# Patient Record
Sex: MALE | Race: WHITE | NOT HISPANIC OR LATINO
[De-identification: names, ages, dates, MRNs, and addresses within clinical notes are randomized per-mention and may not be internally consistent; named-entity substitution may affect disease eponyms.]

---
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
## 2018-06-15 ENCOUNTER — EMERGENCY (EMERGENCY)
Facility: HOSPITAL | Age: 18
LOS: 1 days | Discharge: HOME | End: 2018-06-15
Attending: EMERGENCY MEDICINE
Payer: COMMERCIAL

## 2018-06-15 VITALS
WEIGHT: 164.91 LBS | HEART RATE: 75 BPM | SYSTOLIC BLOOD PRESSURE: 101 MMHG | OXYGEN SATURATION: 98 % | DIASTOLIC BLOOD PRESSURE: 63 MMHG | HEIGHT: 70 IN | TEMPERATURE: 98 F | RESPIRATION RATE: 19 BRPM

## 2018-06-15 DIAGNOSIS — Z98.890 OTHER SPECIFIED POSTPROCEDURAL STATES: Chronic | ICD-10-CM

## 2018-06-15 PROCEDURE — 99283 EMERGENCY DEPT VISIT LOW MDM: CPT

## 2018-06-15 PROCEDURE — 73562 X-RAY EXAM OF KNEE 3: CPT | Mod: 26,RT

## 2018-06-15 PROCEDURE — 73562 X-RAY EXAM OF KNEE 3: CPT

## 2018-06-15 RX ORDER — IBUPROFEN 200 MG
600 TABLET ORAL ONCE
Qty: 0 | Refills: 0 | Status: COMPLETED | OUTPATIENT
Start: 2018-06-15 | End: 2018-06-15

## 2018-06-15 RX ADMIN — Medication 600 MILLIGRAM(S): at 14:45

## 2018-06-15 RX ADMIN — Medication 600 MILLIGRAM(S): at 15:03

## 2018-06-15 NOTE — ED ADULT TRIAGE NOTE - CHIEF COMPLAINT QUOTE
pt was doing yard work, fell on his knee. pt is unable to move the right knee  pt denies LOC or head injury

## 2018-06-15 NOTE — ED PROVIDER NOTE - CARE PLAN
Principal Discharge DX:	Other closed fracture of right patella, initial encounter Principal Discharge DX:	Acute pain of right knee

## 2018-06-15 NOTE — ED ADULT NURSE NOTE - OBJECTIVE STATEMENT
Patient was gardening and fell forward landing on his right knee. Patient having difficulty bearing weight and moving the knee.

## 2018-06-15 NOTE — ED PROVIDER NOTE - OBJECTIVE STATEMENT
17 year old bib family non-ambulatory with c/o R knee pain. was gardening and fell directly onto R knee (patella) pian since over area and just above patella. Worse on quadriceps engagement. No other knee ain/swelling/laxity. No other injury or c/o  PMHx/Meds/All: none  Tet: UTD  Soc Hx: infreg etoh o/wise neg

## 2018-06-15 NOTE — ED PROVIDER NOTE - MEDICAL DECISION MAKING DETAILS
MD Precious,Attending: please see above PE. for xrays to evaluate for possible patella fracture (bipartate). for ortho referral if positive and likely d/c with knee immobilized and crutches for early followup and prn analgesia at home

## 2018-06-15 NOTE — ED PROVIDER NOTE - PHYSICAL EXAMINATION
MD Precious,Attending: tnedrness over patella with mild swelling. ? bipartaite fracture with small fragment off superomedial patella sitting lower patellar ligamemnt above jpint. No effusion of knee joint laxity. Exam otherwise NAD. Distally NVI

## 2018-06-19 ENCOUNTER — APPOINTMENT (OUTPATIENT)
Dept: SPORTS MEDICINE | Facility: CLINIC | Age: 18
End: 2018-06-19
Payer: COMMERCIAL

## 2018-06-19 DIAGNOSIS — S76.109A UNSPECIFIED INJURY OF UNSPECIFIED QUADRICEPS MUSCLE, FASCIA AND TENDON, INITIAL ENCOUNTER: ICD-10-CM

## 2018-06-19 DIAGNOSIS — M25.561 PAIN IN RIGHT KNEE: ICD-10-CM

## 2018-06-19 DIAGNOSIS — S80.01XA CONTUSION OF RIGHT KNEE, INITIAL ENCOUNTER: ICD-10-CM

## 2018-06-19 PROCEDURE — 29530 STRAPPING OF KNEE: CPT

## 2018-06-19 PROCEDURE — 99203 OFFICE O/P NEW LOW 30 MIN: CPT | Mod: 25

## 2018-10-01 ENCOUNTER — OUTPATIENT (OUTPATIENT)
Dept: OUTPATIENT SERVICES | Facility: HOSPITAL | Age: 18
LOS: 1 days | End: 2018-10-01
Payer: COMMERCIAL

## 2018-10-01 ENCOUNTER — APPOINTMENT (OUTPATIENT)
Dept: MRI IMAGING | Facility: CLINIC | Age: 18
End: 2018-10-01
Payer: COMMERCIAL

## 2018-10-01 DIAGNOSIS — S83.242A OTHER TEAR OF MEDIAL MENISCUS, CURRENT INJURY, LEFT KNEE, INITIAL ENCOUNTER: ICD-10-CM

## 2018-10-01 DIAGNOSIS — Z98.890 OTHER SPECIFIED POSTPROCEDURAL STATES: Chronic | ICD-10-CM

## 2018-10-01 PROCEDURE — 73721 MRI JNT OF LWR EXTRE W/O DYE: CPT

## 2018-10-01 PROCEDURE — 73721 MRI JNT OF LWR EXTRE W/O DYE: CPT | Mod: 26,LT

## 2018-11-27 ENCOUNTER — EMERGENCY (EMERGENCY)
Facility: HOSPITAL | Age: 18
LOS: 1 days | Discharge: HOME | End: 2018-11-27
Attending: EMERGENCY MEDICINE
Payer: COMMERCIAL

## 2018-11-27 VITALS
OXYGEN SATURATION: 100 % | HEART RATE: 74 BPM | WEIGHT: 164.91 LBS | TEMPERATURE: 98 F | SYSTOLIC BLOOD PRESSURE: 127 MMHG | RESPIRATION RATE: 18 BRPM | HEIGHT: 69 IN | DIASTOLIC BLOOD PRESSURE: 72 MMHG

## 2018-11-27 DIAGNOSIS — Z98.890 OTHER SPECIFIED POSTPROCEDURAL STATES: Chronic | ICD-10-CM

## 2018-11-27 PROCEDURE — 99283 EMERGENCY DEPT VISIT LOW MDM: CPT | Mod: 25

## 2018-11-27 PROCEDURE — 12002 RPR S/N/AX/GEN/TRNK2.6-7.5CM: CPT

## 2018-11-27 PROCEDURE — 90715 TDAP VACCINE 7 YRS/> IM: CPT

## 2018-11-27 PROCEDURE — 12002 RPR S/N/AX/GEN/TRNK2.6-7.5CM: CPT | Mod: FA

## 2018-11-27 PROCEDURE — 90471 IMMUNIZATION ADMIN: CPT

## 2018-11-27 RX ORDER — TETANUS TOXOID, REDUCED DIPHTHERIA TOXOID AND ACELLULAR PERTUSSIS VACCINE, ADSORBED 5; 2.5; 8; 8; 2.5 [IU]/.5ML; [IU]/.5ML; UG/.5ML; UG/.5ML; UG/.5ML
0.5 SUSPENSION INTRAMUSCULAR ONCE
Qty: 0 | Refills: 0 | Status: COMPLETED | OUTPATIENT
Start: 2018-11-27 | End: 2018-11-27

## 2018-11-27 RX ORDER — IBUPROFEN 200 MG
400 TABLET ORAL ONCE
Qty: 0 | Refills: 0 | Status: COMPLETED | OUTPATIENT
Start: 2018-11-27 | End: 2018-11-27

## 2018-11-27 RX ADMIN — TETANUS TOXOID, REDUCED DIPHTHERIA TOXOID AND ACELLULAR PERTUSSIS VACCINE, ADSORBED 0.5 MILLILITER(S): 5; 2.5; 8; 8; 2.5 SUSPENSION INTRAMUSCULAR at 17:42

## 2018-11-27 RX ADMIN — Medication 400 MILLIGRAM(S): at 17:42

## 2018-11-27 NOTE — ED PROVIDER NOTE - PLAN OF CARE
You were seen in the emergency department for a laceration. Please follow up with your primary doctor in the next 5-7 days. Visit your doctor, an urgent care, or the emergency department in 7-10 days to have the sutures removed. Keep the affected area clean and dry, and apply bacitracin twice a day. Return to the emergency department immediately if you experience increased swelling, redness, drainage of pus from the area, fever or any other concerning symptoms.

## 2018-11-27 NOTE — ED ADULT TRIAGE NOTE - CHIEF COMPLAINT QUOTE
Patient presents with a left first finger injury. Patient has bleeding controlled via direct pressure. Patient unsure of last tetanus immunization.

## 2018-11-27 NOTE — ED PROVIDER NOTE - SKIN AREA #1
distal/3cm lac on volar surface of distal digit of left thumb, just adjacent to but not involving nail, with flap like 2cm piece of skin 1cm lac on volar surface of distal digit of left thumb, just adjacent to but not involving nail, with flap like 2cm piece of skin/distal

## 2018-11-27 NOTE — ED PROCEDURE NOTE - ATTENDING CONTRIBUTION TO CARE
Attending MD Becki Dial: Risks, benefit and alternatives of procedure explained to patient and patient demonstrated verbal understanding and consent.  I was present during the key portions of the procedure. Patient tolerated procedure well without complications

## 2018-11-27 NOTE — ED PROVIDER NOTE - OBJECTIVE STATEMENT
19 y/o M w pmhx of pilonidal cyst, asthma and pshx of ear tube placement p/w left thumb injury while opening up a box with a knife today. Notes blood and pain at the site. Pt says he taped it up immediately after injury. Knife was brand new and clean. Unsure of his last tetanus shot.   PCP: Bar Mars 17 y/o M w pmhx of pilonidal cyst, asthma and pshx of ear tube placement p/w left thumb injury while opening up a box with a knife today. Notes blood and pain at the site. Pt says he taped it up immediately after injury. Knife was brand new and clean. Pt unsure of his last tetanus shot.   PCP: Bar Mars

## 2018-11-27 NOTE — ED PROVIDER NOTE - NS_ ATTENDINGSCRIBEDETAILS _ED_A_ED_FT
Becki Dial MD - Attending Physician: The scribe's documentation has been prepared under my direction and personally reviewed by me in its entirety. I confirm that the note above accurately reflects all work, treatment, procedures, and medical decision making performed by me.

## 2018-11-27 NOTE — ED ADULT NURSE NOTE - OBJECTIVE STATEMENT
Pt presents to ED awake, alert and ambulatory, c/o finger lac. Pt states he was using a new knife to open a box and slipped and cut his right thumb. Bled at first and pt taped it which stopped the bleeding. Dried blood to cuticle around right thumb extending around medially. No active bleeding noted. No blood thinners. No other injuries.

## 2018-11-27 NOTE — ED PROVIDER NOTE - ATTENDING CONTRIBUTION TO CARE
Becki Dial MD - Attending Physician: I have personally seen and examined this patient with the resident/fellow.  I have fully participated in the care of this patient. I have reviewed all pertinent clinical information, including history, physical exam, plan and the Resident/Fellow’s note and agree except as noted. See MDM

## 2018-11-27 NOTE — ED PROVIDER NOTE - MEDICAL DECISION MAKING DETAILS
18m here for left thumb lac 2/2 cutting self with clean knife at home while opening box. Mild active bleeding of 3cm volar distal thumb lac. Will repair lac, pain ctrl, tdap dc 18m here for left thumb lac 2/2 cutting self with clean knife at home while opening box. Mild active bleeding of 3cm volar distal thumb lac. Will repair lac, pain ctrl, tdap dc    Becki Dial MD - Attending Physician: Pt here with thumb lac, no other injuries. Wound care, Tdap for dc

## 2018-11-27 NOTE — ED PROVIDER NOTE - CARE PLAN
Principal Discharge DX:	Thumb laceration, left, initial encounter  Assessment and plan of treatment:	You were seen in the emergency department for a laceration. Please follow up with your primary doctor in the next 5-7 days. Visit your doctor, an urgent care, or the emergency department in 7-10 days to have the sutures removed. Keep the affected area clean and dry, and apply bacitracin twice a day. Return to the emergency department immediately if you experience increased swelling, redness, drainage of pus from the area, fever or any other concerning symptoms.

## 2021-06-11 NOTE — ED PROVIDER NOTE - FAMILY HISTORY
I LM for pt to call office to review. Encountering ongoing in a different message. No pertinent family history in first degree relatives

## 2021-10-04 ENCOUNTER — APPOINTMENT (OUTPATIENT)
Dept: ORTHOPEDIC SURGERY | Facility: CLINIC | Age: 21
End: 2021-10-04

## 2025-06-19 NOTE — ED ADULT NURSE NOTE - CHIEF COMPLAINT
Blood culture with MSSA  Hx of strep bacteremia in April, completed antibiotics then  MRI L spine shows T11/T12 and L1/L2 discitis, per ID will presume infection, no need for IR biopsy  Echo shows thickened anterior mitral valve leaflet, cannot rule out endocarditis;   ID following  Continue ancef  Plan for likely 6 week IV antibiotic course at dialysis center  Cards following , pending MACKENZIE     The patient is a 18y Male complaining of finger pain/injury.